# Patient Record
Sex: FEMALE | Race: WHITE | Employment: FULL TIME | URBAN - METROPOLITAN AREA
[De-identification: names, ages, dates, MRNs, and addresses within clinical notes are randomized per-mention and may not be internally consistent; named-entity substitution may affect disease eponyms.]

---

## 2019-01-18 ENCOUNTER — OFFICE VISIT (OUTPATIENT)
Dept: URGENT CARE | Age: 39
End: 2019-01-18

## 2019-01-18 VITALS
HEART RATE: 101 BPM | RESPIRATION RATE: 18 BRPM | TEMPERATURE: 98 F | WEIGHT: 140 LBS | SYSTOLIC BLOOD PRESSURE: 138 MMHG | HEIGHT: 62 IN | DIASTOLIC BLOOD PRESSURE: 86 MMHG | OXYGEN SATURATION: 98 % | BODY MASS INDEX: 25.76 KG/M2

## 2019-01-18 DIAGNOSIS — J11.1 INFLUENZA: Primary | ICD-10-CM

## 2019-01-18 DIAGNOSIS — J45.41 MODERATE PERSISTENT ASTHMA WITH ACUTE EXACERBATION: ICD-10-CM

## 2019-01-18 DIAGNOSIS — Z20.828 EXPOSURE TO THE FLU: ICD-10-CM

## 2019-01-18 DIAGNOSIS — R68.89 FLU-LIKE SYMPTOMS: ICD-10-CM

## 2019-01-18 LAB
FLUAV+FLUBV AG NOSE QL IA.RAPID: NEGATIVE POS/NEG
FLUAV+FLUBV AG NOSE QL IA.RAPID: NEGATIVE POS/NEG
VALID INTERNAL CONTROL?: YES

## 2019-01-18 RX ORDER — ALBUTEROL SULFATE 90 UG/1
2 AEROSOL, METERED RESPIRATORY (INHALATION)
Qty: 1 INHALER | Refills: 0 | Status: SHIPPED | OUTPATIENT
Start: 2019-01-18 | End: 2019-06-13 | Stop reason: ALTCHOICE

## 2019-01-18 RX ORDER — PREDNISONE 20 MG/1
TABLET ORAL
Qty: 8 TAB | Refills: 0 | Status: SHIPPED | OUTPATIENT
Start: 2019-01-18 | End: 2020-08-13

## 2019-01-18 RX ORDER — IPRATROPIUM BROMIDE AND ALBUTEROL SULFATE 2.5; .5 MG/3ML; MG/3ML
3 SOLUTION RESPIRATORY (INHALATION)
Status: COMPLETED | OUTPATIENT
Start: 2019-01-18 | End: 2019-01-18

## 2019-01-18 RX ORDER — OSELTAMIVIR PHOSPHATE 75 MG/1
75 CAPSULE ORAL 2 TIMES DAILY
Qty: 10 CAP | Refills: 0 | Status: SHIPPED | OUTPATIENT
Start: 2019-01-18 | End: 2019-01-23

## 2019-01-18 RX ADMIN — IPRATROPIUM BROMIDE AND ALBUTEROL SULFATE 3 ML: 2.5; .5 SOLUTION RESPIRATORY (INHALATION) at 16:06

## 2019-01-18 NOTE — PATIENT INSTRUCTIONS
Follow up with PCP without improvement over next 5-7 days sooner/immediately for new or worsening  Go to ED immediately for any worsening trouble breathing, new worsening or changes. Influenza (Flu): Care Instructions  Your Care Instructions    Influenza (flu) is an infection in the lungs and breathing passages. It is caused by the influenza virus. There are different strains, or types, of the flu virus from year to year. Unlike the common cold, the flu comes on suddenly and the symptoms, such as a cough, congestion, fever, chills, fatigue, aches, and pains, are more severe. These symptoms may last up to 10 days. Although the flu can make you feel very sick, it usually doesn't cause serious health problems. Home treatment is usually all you need for flu symptoms. But your doctor may prescribe antiviral medicine to prevent other health problems, such as pneumonia, from developing. Older people and those who have a long-term health condition, such as lung disease, are most at risk for having pneumonia or other health problems. Follow-up care is a key part of your treatment and safety. Be sure to make and go to all appointments, and call your doctor if you are having problems. It's also a good idea to know your test results and keep a list of the medicines you take. How can you care for yourself at home? · Get plenty of rest.  · Drink plenty of fluids, enough so that your urine is light yellow or clear like water. If you have kidney, heart, or liver disease and have to limit fluids, talk with your doctor before you increase the amount of fluids you drink. · Take an over-the-counter pain medicine if needed, such as acetaminophen (Tylenol), ibuprofen (Advil, Motrin), or naproxen (Aleve), to relieve fever, headache, and muscle aches. Read and follow all instructions on the label. No one younger than 20 should take aspirin. It has been linked to Reye syndrome, a serious illness. · Do not smoke.  Smoking can make the flu worse. If you need help quitting, talk to your doctor about stop-smoking programs and medicines. These can increase your chances of quitting for good. · Breathe moist air from a hot shower or from a sink filled with hot water to help clear a stuffy nose. · Before you use cough and cold medicines, check the label. These medicines may not be safe for young children or for people with certain health problems. · If the skin around your nose and lips becomes sore, put some petroleum jelly on the area. · To ease coughing:  ? Drink fluids to soothe a scratchy throat. ? Suck on cough drops or plain hard candy. ? Take an over-the-counter cough medicine that contains dextromethorphan to help you get some sleep. Read and follow all instructions on the label. ? Raise your head at night with an extra pillow. This may help you rest if coughing keeps you awake. · Take any prescribed medicine exactly as directed. Call your doctor if you think you are having a problem with your medicine. To avoid spreading the flu  · Wash your hands regularly, and keep your hands away from your face. · Stay home from school, work, and other public places until you are feeling better and your fever has been gone for at least 24 hours. The fever needs to have gone away on its own without the help of medicine. · Ask people living with you to talk to their doctors about preventing the flu. They may get antiviral medicine to keep from getting the flu from you. · To prevent the flu in the future, get a flu vaccine every fall. Encourage people living with you to get the vaccine. · Cover your mouth when you cough or sneeze. When should you call for help? Call 911 anytime you think you may need emergency care.  For example, call if:    · You have severe trouble breathing.    Call your doctor now or seek immediate medical care if:    · You have new or worse trouble breathing.     · You seem to be getting much sicker.     · You feel very sleepy or confused.     · You have a new or higher fever.     · You get a new rash.    Watch closely for changes in your health, and be sure to contact your doctor if:    · You begin to get better and then get worse.     · You are not getting better after 1 week. Where can you learn more? Go to http://tushar-richie.info/. Enter O804 in the search box to learn more about \"Influenza (Flu): Care Instructions. \"  Current as of: September 5, 2018  Content Version: 11.9  © 9019-2910 [employer]. Care instructions adapted under license by [employer] (which disclaims liability or warranty for this information). If you have questions about a medical condition or this instruction, always ask your healthcare professional. [address] any warranty or liability for your use of this information. Wheezing or Bronchoconstriction: Care Instructions  Your Care Instructions  Wheezing is a whistling noise made during breathing. It occurs when the small airways, or bronchial tubes, that lead to your lungs swell or contract (spasm) and become narrow. This narrowing is called bronchoconstriction. When your airways constrict, it is hard for air to pass through and this makes it hard for you to breathe. Wheezing and bronchoconstriction can be caused by many problems, including:  · An infection such as the flu or a cold. · Allergies such as hay fever. · Diseases such as asthma or chronic obstructive pulmonary disease. · Smoking. Treatment for your wheezing depends on what is causing the problem. Your wheezing may get better without treatment. But you may need to pay attention to things that cause your wheezing and avoid them. Or you may need medicine to help treat the wheezing and to reduce the swelling or to relieve spasms in your lungs. Follow-up care is a key part of your treatment and safety.  Be sure to make and go to all appointments, and call your doctor if you are having problems. It is also a good idea to know your test results and keep a list of the medicines you take. How can you care for yourself at home? · Take your medicine exactly as prescribed. Call your doctor if you think you are having a problem with your medicine. You will get more details on the specific medicine your doctor prescribes. · If your doctor prescribed antibiotics, take them as directed. Do not stop taking them just because you feel better. You need to take the full course of antibiotics. · Breathe moist air from a humidifier, hot shower, or sink filled with hot water. This may help ease your symptoms and make it easier for you to breathe. · If you have congestion in your nose and throat, drinking plenty of fluids, especially hot fluids, may help relieve your symptoms. If you have kidney, heart, or liver disease and have to limit fluids, talk with your doctor before you increase the amount of fluids you drink. · If you have mucus in your airways, it may help to breathe deeply and cough. · Do not smoke or allow others to smoke around you. Smoking can make your wheezing worse. If you need help quitting, talk to your doctor about stop-smoking programs and medicines. These can increase your chances of quitting for good. · Avoid things that may cause your wheezing. These may include colds, smoke, air pollution, dust, pollen, pets, cockroaches, stress, and cold air. When should you call for help? Call 911 anytime you think you may need emergency care.  For example, call if:    · You have severe trouble breathing.     · You passed out (lost consciousness).    Call your doctor now or seek immediate medical care if:    · You cough up yellow, dark brown, or bloody mucus (sputum).     · You have new or worse shortness of breath.     · Your wheezing is not getting better or it gets worse after you start taking your medicine.    Watch closely for changes in your health, and be sure to contact your doctor if:    · You do not get better as expected. Where can you learn more? Go to http://tushar-richie.info/. Enter 999 1982 in the search box to learn more about \"Wheezing or Bronchoconstriction: Care Instructions. \"  Current as of: September 5, 2018  Content Version: 11.9  © 2234-7752 [employer]. Care instructions adapted under license by [employer] (which disclaims liability or warranty for this information). If you have questions about a medical condition or this instruction, always ask your healthcare professional. [place] [address] any warranty or liability for your use of this information.

## 2019-01-18 NOTE — PROGRESS NOTES
Here for cough  Has had worsening cough over past week  Dry tight with wheezing and night awakenings hx of ashtma but doesn't currently have a working albuterol inhaler  Son just got diagnosed as positive flu. She also today has new onset body aches and tiredness. No measured fever or chills. Denies chest pain, near syncope, nausea or vomiting  Overall worsening. Requesting albuterol refill today. She is a daily smoker. Past Medical History:   Diagnosis Date    Abnormal Pap smear     Asthma         Past Surgical History:   Procedure Laterality Date    HX OTHER SURGICAL  leep 1999         Family History   Problem Relation Age of Onset    Hypertension Mother         Social History     Socioeconomic History    Marital status: UNKNOWN     Spouse name: Not on file    Number of children: Not on file    Years of education: Not on file    Highest education level: Not on file   Social Needs    Financial resource strain: Not on file    Food insecurity - worry: Not on file    Food insecurity - inability: Not on file   Wallstr needs - medical: Not on file   Wallstr needs - non-medical: Not on file   Occupational History    Not on file   Tobacco Use    Smoking status: Current Every Day Smoker     Packs/day: 1.00    Smokeless tobacco: Never Used   Substance and Sexual Activity    Alcohol use: No    Drug use: Not on file    Sexual activity: Not Currently     Partners: Male     Birth control/protection: None   Other Topics Concern    Not on file   Social History Narrative    Not on file                ALLERGIES: Patient has no known allergies. Review of Systems   Constitutional: Positive for fatigue. Respiratory: Positive for wheezing. Cardiovascular: Negative for chest pain and palpitations. Gastrointestinal: Negative for nausea and vomiting. Neurological: Negative for light-headedness. All other systems reviewed and are negative.       Vitals:    01/18/19 1512 01/18/19 1622   BP: 138/86    Pulse: (!) 112 (!) 101   Resp: 18    Temp: 98 °F (36.7 °C)    SpO2: 97% 98%   Weight: 140 lb (63.5 kg)    Height: 5' 2\" (1.575 m)        Physical Exam   Constitutional: She is oriented to person, place, and time. No distress. Non-toxic appearing, well hydrated   HENT:   OP clear and moist  TMs normal  Nasal turbinates erythematous with nasal sniffling. Eyes: Conjunctivae and EOM are normal. Pupils are equal, round, and reactive to light. No scleral icterus. Neck: Normal range of motion. Neck supple. Cardiovascular: Regular rhythm and normal heart sounds. Exam reveals no gallop and no friction rub. No murmur heard. Apical pulse 101bpm   Pulmonary/Chest: Effort normal. No stridor. No respiratory distress. She has no rales. Wheezing present throughout all lung fields: post neb: wheezing still present but moderate improvement. Patient promotes that she \"feels so much better\" is breathing better. No diminished breath sounds. Lymphadenopathy:     She has no cervical adenopathy. Neurological: She is alert and oriented to person, place, and time. Skin: Skin is warm and dry. No rash noted. She is not diaphoretic. No erythema. No pallor. Psychiatric: She has a normal mood and affect. Her behavior is normal. Thought content normal.   Nursing note and vitals reviewed. MDM     Differential Diagnosis; Clinical Impression; Plan:       CLINICAL IMPRESSION:  (N45.507) Exposure to the flu  (primary encounter diagnosis)  (R68.89) Flu-like symptoms  (J45.41) Moderate persistent asthma with acute exacerbation    Orders Placed This Encounter      oseltamivir (TAMIFLU) 75 mg capsule          Sig: Take 1 Cap by mouth two (2) times a day for 5 days. Dispense:  10 Cap          Refill:  0      albuterol (PROVENTIL HFA, VENTOLIN HFA, PROAIR HFA) 90 mcg/actuation inhaler           Sig: Take 2 Puffs by inhalation every six (6) hours as needed for Wheezing.           Dispense:  1 Inhaler          Refill:  0      predniSONE (DELTASONE) 20 mg tablet          Sig: Take 2 tabs by mouth one time daily with food for 4 days. Dispense:  8 Tab          Refill:  0      albuterol-ipratropium (DUO-NEB) 2.5 MG-0.5 MG/3 ML          Order Specific Question: MODE OF DELIVERY          Answer: Nebulizer      Plan:  1. Negative flu test. This appears to be astham exacerbation with new possible flu (son tested positive) will rx albuterol and short burst of prednisone and have start tamiflu  2. Maintain adequate fluid intake  3. Review handouts  4. Advised immediate ED eval for new, worsening or changes. We have reviewed concerning signs/symptoms, normal vs abnormal progression of medical condition and when to seek immediate medical attention. Schedule with PCP or Urgent Care immediately for worsening or new symptoms. See your PCP if there is not at least some improvement in symptoms within the next 4-5 days. You should see your PCP for updates on your routine health maintenance. XR Results (most recent):  Results from Appointment encounter on 01/18/19   XR CHEST PA LAT    Narrative EXAM: XR CHEST PA LAT    INDICATION: SOB, cough x 2 weeks, body aches    COMPARISON: None. FINDINGS: PA and lateral radiographs of the chest demonstrate clear lungs. The  cardiac and mediastinal contours and pulmonary vascularity are normal. The bones  and soft tissues are within normal limits.        Impression IMPRESSION: Normal chest.         Procedures

## 2019-01-19 RX ORDER — OSELTAMIVIR PHOSPHATE 75 MG/1
75 CAPSULE ORAL 2 TIMES DAILY
Qty: 10 CAP | Refills: 0 | Status: SHIPPED | OUTPATIENT
Start: 2019-01-19 | End: 2019-01-24

## 2019-06-13 ENCOUNTER — OFFICE VISIT (OUTPATIENT)
Dept: INTERNAL MEDICINE CLINIC | Age: 39
End: 2019-06-13

## 2019-06-13 VITALS
TEMPERATURE: 98.3 F | HEART RATE: 91 BPM | RESPIRATION RATE: 16 BRPM | BODY MASS INDEX: 28.89 KG/M2 | WEIGHT: 157 LBS | HEIGHT: 62 IN | SYSTOLIC BLOOD PRESSURE: 100 MMHG | DIASTOLIC BLOOD PRESSURE: 74 MMHG | OXYGEN SATURATION: 96 %

## 2019-06-13 DIAGNOSIS — J31.0 RHINITIS, UNSPECIFIED TYPE: ICD-10-CM

## 2019-06-13 DIAGNOSIS — J45.41 MODERATE PERSISTENT ASTHMA WITH ACUTE EXACERBATION: ICD-10-CM

## 2019-06-13 DIAGNOSIS — J02.9 PHARYNGITIS, UNSPECIFIED ETIOLOGY: ICD-10-CM

## 2019-06-13 DIAGNOSIS — F19.11 HISTORY OF SUBSTANCE ABUSE (HCC): ICD-10-CM

## 2019-06-13 DIAGNOSIS — F17.200 CURRENT EVERY DAY SMOKER: ICD-10-CM

## 2019-06-13 DIAGNOSIS — Z76.89 ESTABLISHING CARE WITH NEW DOCTOR, ENCOUNTER FOR: Primary | ICD-10-CM

## 2019-06-13 DIAGNOSIS — R91.8 LUNG FIELD ABNORMAL FINDING ON EXAMINATION: ICD-10-CM

## 2019-06-13 LAB
CHOLEST SERPL-MCNC: 171 MG/DL
GLUCOSE POC: 112 MG/DL
HBA1C MFR BLD HPLC: 5.3 %
HDLC SERPL-MCNC: 52 MG/DL
LDL CHOLESTEROL POC: 99 MG/DL
NON-HDL CHOLESTEROL, 011976: 120
TCHOL/HDL RATIO (POC): 99
TRIGL SERPL-MCNC: 106 MG/DL

## 2019-06-13 RX ORDER — FLUTICASONE PROPIONATE 50 MCG
2 SPRAY, SUSPENSION (ML) NASAL DAILY
Qty: 1 BOTTLE | Refills: 1 | Status: SHIPPED | OUTPATIENT
Start: 2019-06-13 | End: 2022-09-27

## 2019-06-13 RX ORDER — ALBUTEROL SULFATE 90 UG/1
1 AEROSOL, METERED RESPIRATORY (INHALATION)
Qty: 3 INHALER | Refills: 3 | Status: SHIPPED | OUTPATIENT
Start: 2019-06-13 | End: 2020-03-19 | Stop reason: SDUPTHER

## 2019-06-13 NOTE — PATIENT INSTRUCTIONS
Call if not gettting better. Discussed before pt left but couldn't wait for AVS reprint. · Smoking cessation need, pt desires reviewed briefly today, more detail on RV. Interested in [facility]. · Can use OTC   · Afrin type nasal spray first day or two for faster relief  · Mucinex Max declined, has OTC NSAID for cold.

## 2019-06-13 NOTE — PROGRESS NOTES
1. Have you been to the ER, urgent care clinic since your last visit? Hospitalized since your last visit?no    2. Have you seen or consulted any other health care providers outside of the [address] [place] [address] since your last visit? Include any pap smears or colon screening. No    3 most recent PHQ Screens 6/13/2019   Little interest or pleasure in doing things Several days   Feeling down, depressed, irritable, or hopeless Not at all   Total Score PHQ 2 1     Chief Complaint   Patient presents with    New Patient    Ear Pain    URI    Sore Throat     Per Dr. Rios Fears.,  verbal order given for needed amb poc labs.

## 2019-06-18 LAB
ALBUMIN SERPL-MCNC: 4.1 G/DL (ref 3.5–5.5)
ALBUMIN/GLOB SERPL: 1.5 {RATIO} (ref 1.2–2.2)
ALP SERPL-CCNC: 62 IU/L (ref 39–117)
ALT SERPL-CCNC: 8 IU/L (ref 0–32)
AST SERPL-CCNC: 19 IU/L (ref 0–40)
BASOPHILS # BLD AUTO: 0.1 X10E3/UL (ref 0–0.2)
BASOPHILS NFR BLD AUTO: 1 %
BILIRUB SERPL-MCNC: 0.5 MG/DL (ref 0–1.2)
BUN SERPL-MCNC: 9 MG/DL (ref 6–24)
BUN/CREAT SERPL: 14 (ref 9–23)
CALCIUM SERPL-MCNC: 9.5 MG/DL (ref 8.7–10.2)
CHLORIDE SERPL-SCNC: 101 MMOL/L (ref 96–106)
CO2 SERPL-SCNC: 25 MMOL/L (ref 20–29)
CREAT SERPL-MCNC: 0.66 MG/DL (ref 0.57–1)
EOSINOPHIL # BLD AUTO: 0.3 X10E3/UL (ref 0–0.4)
EOSINOPHIL NFR BLD AUTO: 3 %
ERYTHROCYTE [DISTWIDTH] IN BLOOD BY AUTOMATED COUNT: 13.4 % (ref 12.3–15.4)
GLOBULIN SER CALC-MCNC: 2.7 G/DL (ref 1.5–4.5)
GLUCOSE SERPL-MCNC: 118 MG/DL (ref 65–99)
HCT VFR BLD AUTO: 41.7 % (ref 34–46.6)
HGB BLD-MCNC: 13.6 G/DL (ref 11.1–15.9)
IMM GRANULOCYTES # BLD AUTO: 0 X10E3/UL (ref 0–0.1)
IMM GRANULOCYTES NFR BLD AUTO: 0 %
LYMPHOCYTES # BLD AUTO: 1.4 X10E3/UL (ref 0.7–3.1)
LYMPHOCYTES NFR BLD AUTO: 17 %
MCH RBC QN AUTO: 29.1 PG (ref 26.6–33)
MCHC RBC AUTO-ENTMCNC: 32.6 G/DL (ref 31.5–35.7)
MCV RBC AUTO: 89 FL (ref 79–97)
MONOCYTES # BLD AUTO: 0.9 X10E3/UL (ref 0.1–0.9)
MONOCYTES NFR BLD AUTO: 11 %
NEUTROPHILS # BLD AUTO: 5.9 X10E3/UL (ref 1.4–7)
NEUTROPHILS NFR BLD AUTO: 68 %
PLATELET # BLD AUTO: 268 X10E3/UL (ref 150–450)
POTASSIUM SERPL-SCNC: 5.5 MMOL/L (ref 3.5–5.2)
PROT SERPL-MCNC: 6.8 G/DL (ref 6–8.5)
RBC # BLD AUTO: 4.68 X10E6/UL (ref 3.77–5.28)
SODIUM SERPL-SCNC: 143 MMOL/L (ref 134–144)
WBC # BLD AUTO: 8.6 X10E3/UL (ref 3.4–10.8)

## 2020-03-04 PROBLEM — J45.909 ASTHMA: Status: ACTIVE | Noted: 2020-03-04

## 2020-03-04 PROBLEM — F19.11 HISTORY OF SUBSTANCE ABUSE (HCC): Status: ACTIVE | Noted: 2020-03-04
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
## 2020-03-19 DIAGNOSIS — J45.41 MODERATE PERSISTENT ASTHMA WITH ACUTE EXACERBATION: ICD-10-CM

## 2020-03-19 RX ORDER — ALBUTEROL SULFATE 90 UG/1
1 AEROSOL, METERED RESPIRATORY (INHALATION)
Qty: 2 INHALER | Refills: 0 | Status: SHIPPED | OUTPATIENT
Start: 2020-03-19 | End: 2020-04-03

## 2020-04-03 DIAGNOSIS — J45.41 MODERATE PERSISTENT ASTHMA WITH ACUTE EXACERBATION: ICD-10-CM

## 2020-04-03 RX ORDER — ALBUTEROL SULFATE 90 UG/1
1 AEROSOL, METERED RESPIRATORY (INHALATION)
Qty: 3 INHALER | Refills: 1 | Status: SHIPPED | OUTPATIENT
Start: 2020-04-03

## 2020-08-13 ENCOUNTER — OFFICE VISIT (OUTPATIENT)
Dept: URGENT CARE | Age: 40
End: 2020-08-13
Payer: COMMERCIAL

## 2020-08-13 VITALS — HEART RATE: 76 BPM | TEMPERATURE: 97.7 F | RESPIRATION RATE: 16 BRPM | OXYGEN SATURATION: 98 %

## 2020-08-13 DIAGNOSIS — Z11.59 SCREENING FOR VIRAL DISEASE: Primary | ICD-10-CM

## 2020-08-13 PROCEDURE — 99212 OFFICE O/P EST SF 10 MIN: CPT | Performed by: FAMILY MEDICINE

## 2020-08-13 NOTE — PROGRESS NOTES
2020    Peyton Callow (:  1980) is a 38 y.o.  female, here requesting COVID-19 testing    History of Present Illness  Close contact with a lab confirmed COVID-19 patient within 14 days of symptom onset     Visit Vitals  Pulse 76   Temp 97.7 °F (36.5 °C)   Resp 16   SpO2 98%       ASSESSMENT  Screening for COVID-19/ Viral disease    PLAN  POCT influenza testing - Not Tested  COVID-19 sample collected and submitted. Patient given detailed CDC instructions contained within After Visit Summary. An  electronic signature was used to authenticate this note.     --Graeme Mcintosh MD

## 2020-08-15 LAB — SARS-COV-2, NAA: NOT DETECTED

## 2020-08-24 ENCOUNTER — TELEPHONE (OUTPATIENT)
Dept: INTERNAL MEDICINE CLINIC | Age: 40
End: 2020-08-24

## 2020-08-24 NOTE — TELEPHONE ENCOUNTER
Called pt back to schedule 1st available NP appt, unable to LVM, box full.       ----- Message from [employer] sent at 8/22/2020 10:55 AM EDT -----  Regarding: Dr. Ras Nolasco  Appointment not available    Caller's first and last name and relationship to patient (if not the patient): Pt      Best contact number: [phone]      Preferred date and time: Any      Scheduled appointment date and time: N/A      Reason for appointment: Would like to be considered for a NP       Details to clarify the request:      [employer]

## 2020-08-26 ENCOUNTER — TELEPHONE (OUTPATIENT)
Dept: INTERNAL MEDICINE CLINIC | Age: 40
End: 2020-08-26

## 2020-08-26 NOTE — TELEPHONE ENCOUNTER
Called back to schedule NP appt, vm box full.    ----- Message from Markel Nageotte sent at 8/25/2020  3:47 PM EDT -----  Regarding: Dr. Julianne Benavides  Appointment not available    Caller's first and last name and relationship to patient (if not the patient):      Best contact number: [phone]      Preferred date and time: sometime within the week of 9/7-9/11      Scheduled appointment date and time: N/A      Reason for appointment: Pt claimed she was schedule for a NP appointment with the [facility] but nothing was on file and she wanted to R/S for this week enclosed.       Details to clarify the request:      Markel Nageotte

## 2022-03-19 PROBLEM — J45.909 ASTHMA: Status: ACTIVE | Noted: 2020-03-04

## 2022-03-20 PROBLEM — F19.11 HISTORY OF SUBSTANCE ABUSE (HCC): Status: ACTIVE | Noted: 2020-03-04

## 2022-09-27 ENCOUNTER — OFFICE VISIT (OUTPATIENT)
Dept: URGENT CARE | Age: 42
End: 2022-09-27
Payer: COMMERCIAL

## 2022-09-27 VITALS
OXYGEN SATURATION: 94 % | TEMPERATURE: 98.6 F | WEIGHT: 166.9 LBS | BODY MASS INDEX: 30.53 KG/M2 | RESPIRATION RATE: 18 BRPM | DIASTOLIC BLOOD PRESSURE: 76 MMHG | SYSTOLIC BLOOD PRESSURE: 133 MMHG | HEART RATE: 91 BPM

## 2022-09-27 DIAGNOSIS — R05.9 COUGH: ICD-10-CM

## 2022-09-27 DIAGNOSIS — J40 BRONCHITIS: Primary | ICD-10-CM

## 2022-09-27 PROCEDURE — S9083 URGENT CARE CENTER GLOBAL: HCPCS | Performed by: FAMILY MEDICINE

## 2022-09-27 RX ORDER — PREDNISONE 5 MG/1
TABLET ORAL
Qty: 21 TABLET | Refills: 0 | Status: SHIPPED | OUTPATIENT
Start: 2022-09-27

## 2022-09-27 RX ORDER — ALBUTEROL SULFATE 90 UG/1
2 AEROSOL, METERED RESPIRATORY (INHALATION)
Qty: 1 EACH | Refills: 0 | Status: SHIPPED | OUTPATIENT
Start: 2022-09-27

## 2022-09-27 RX ORDER — ALBUTEROL SULFATE 0.83 MG/ML
2.5 SOLUTION RESPIRATORY (INHALATION)
Qty: 45 EACH | Refills: 0 | Status: SHIPPED | OUTPATIENT
Start: 2022-09-27

## 2022-09-27 RX ORDER — DOXYCYCLINE 100 MG/1
100 TABLET ORAL 2 TIMES DAILY
Qty: 20 TABLET | Refills: 0 | Status: SHIPPED | OUTPATIENT
Start: 2022-09-27 | End: 2022-10-07

## 2022-09-27 NOTE — PROGRESS NOTES
Faustino Velazquez is a 36 y.o. female who presents with worsening cough x 1 month; now with SOB for the past week. Also reports fever a few days ago. Has hx of Asthma. The history is provided by the patient. Past Medical History:   Diagnosis Date    Abnormal Pap smear     Asthma         Past Surgical History:   Procedure Laterality Date    HX OTHER SURGICAL  leep 1999         Family History   Problem Relation Age of Onset    Hypertension Mother         Social History     Socioeconomic History    Marital status: SINGLE     Spouse name: Not on file    Number of children: Not on file    Years of education: Not on file    Highest education level: Not on file   Occupational History    Not on file   Tobacco Use    Smoking status: Every Day     Packs/day: 1.00     Years: 24.00     Pack years: 24.00     Types: Cigarettes    Smokeless tobacco: Never   Substance and Sexual Activity    Alcohol use: No    Drug use: Not Currently    Sexual activity: Not Currently     Partners: Male     Birth control/protection: None   Other Topics Concern    Not on file   Social History Narrative    Not on file     Social Determinants of Health     Financial Resource Strain: Not on file   Food Insecurity: Not on file   Transportation Needs: Not on file   Physical Activity: Not on file   Stress: Not on file   Social Connections: Not on file   Intimate Partner Violence: Not on file   Housing Stability: Not on file                ALLERGIES: Patient has no known allergies. Review of Systems   Constitutional:  Negative for activity change, appetite change and fever. Respiratory:  Positive for cough, chest tightness and shortness of breath. Vitals:    09/27/22 1308 09/27/22 1310   BP:  133/76   Pulse:  91   Resp:  18   Temp:  98.6 °F (37 °C)   SpO2:  94%   Weight: 166 lb 14.4 oz (75.7 kg)        Physical Exam  Vitals and nursing note reviewed. Constitutional:       General: She is not in acute distress.      Appearance: She is well-developed. She is not diaphoretic. HENT:      Right Ear: Tympanic membrane, ear canal and external ear normal.      Left Ear: Tympanic membrane, ear canal and external ear normal.      Nose: Congestion present. Right Sinus: No maxillary sinus tenderness or frontal sinus tenderness. Left Sinus: No maxillary sinus tenderness or frontal sinus tenderness. Mouth/Throat:      Pharynx: No oropharyngeal exudate or posterior oropharyngeal erythema. Tonsils: No tonsillar abscesses. Cardiovascular:      Rate and Rhythm: Normal rate and regular rhythm. Heart sounds: Normal heart sounds. Pulmonary:      Effort: Pulmonary effort is normal. No respiratory distress. Breath sounds: No stridor. Wheezing present. No rhonchi or rales. Lymphadenopathy:      Cervical: No cervical adenopathy. Neurological:      Mental Status: She is alert. Psychiatric:         Behavior: Behavior normal.         Thought Content: Thought content normal.         Judgment: Judgment normal.       MDM    ICD-10-CM ICD-9-CM   1. Bronchitis  J40 490   2. Cough  R05.9 786.2       Orders Placed This Encounter    XR CHEST PA LAT     Standing Status:   Future     Number of Occurrences:   1     Standing Expiration Date:   10/28/2023     Order Specific Question:   Is Patient Pregnant? Answer:   No     Order Specific Question:   Reason for Exam     Answer:   worsening cough x 1 month    predniSONE (STERAPRED) 5 mg dose pack     Sig: See administration instruction per 5mg dose pack     Dispense:  21 Tablet     Refill:  0    albuterol (PROVENTIL HFA, VENTOLIN HFA, PROAIR HFA) 90 mcg/actuation inhaler     Sig: Take 2 Puffs by inhalation every six (6) hours as needed for Wheezing. Dispense:  1 Each     Refill:  0    albuterol (PROVENTIL VENTOLIN) 2.5 mg /3 mL (0.083 %) nebu     Sig: 3 mL by Nebulization route every six (6) hours as needed for Wheezing.      Dispense:  45 Each     Refill:  0    doxycycline (ADOXA) 100 mg tablet     Sig: Take 1 Tablet by mouth two (2) times a day for 10 days. Dispense:  20 Tablet     Refill:  0      Start Doxycycline and Prednisone  Albuterol prn  The patient is to follow up with PCP. If signs and symptoms become worse the pt is to go to the ER. XR Results (most recent):  Results from Appointment encounter on 09/27/22    XR CHEST PA LAT    Narrative  Clinical indication: Cough. Frontal and lateral views of the chest obtained, comparison January 18, 2019. The a heart size is normal. There is no acute infiltrate. Mild hyperinflation. Impression  No acute infiltrate.         Procedures

## 2023-05-02 RX ORDER — ALBUTEROL SULFATE 90 UG/1
2 AEROSOL, METERED RESPIRATORY (INHALATION) EVERY 6 HOURS PRN
COMMUNITY
Start: 2022-09-27

## 2023-08-13 ENCOUNTER — HOSPITAL ENCOUNTER (OUTPATIENT)
Facility: HOSPITAL | Age: 43
Setting detail: SPECIMEN
Discharge: HOME | End: 2023-08-16

## 2023-08-13 ENCOUNTER — OFFICE VISIT (OUTPATIENT)
Age: 43
End: 2023-08-13

## 2023-08-13 VITALS
TEMPERATURE: 99.1 F | DIASTOLIC BLOOD PRESSURE: 76 MMHG | OXYGEN SATURATION: 93 % | HEART RATE: 105 BPM | WEIGHT: 170 LBS | SYSTOLIC BLOOD PRESSURE: 127 MMHG

## 2023-08-13 DIAGNOSIS — Z20.822 SUSPECTED COVID-19 VIRUS INFECTION: ICD-10-CM

## 2023-08-13 DIAGNOSIS — B34.9 VIRAL ILLNESS: Primary | ICD-10-CM

## 2023-08-13 LAB
Lab: NORMAL
QC PASS/FAIL: NORMAL
SARS-COV-2, POC: NORMAL
STREP PYOGENES DNA, POC: NEGATIVE
VALID INTERNAL CONTROL, POC: YES

## 2023-08-13 PROCEDURE — 87070 CULTURE OTHR SPECIMN AEROBIC: CPT

## 2023-08-13 RX ORDER — ALBUTEROL SULFATE 90 UG/1
1 AEROSOL, METERED RESPIRATORY (INHALATION) EVERY 4 HOURS PRN
Qty: 18 G | Refills: 0 | Status: SHIPPED | OUTPATIENT
Start: 2023-08-13

## 2023-08-13 ASSESSMENT — ENCOUNTER SYMPTOMS
TROUBLE SWALLOWING: 0
CHEST TIGHTNESS: 0
SHORTNESS OF BREATH: 0
VOMITING: 0
COUGH: 0
RHINORRHEA: 1
NAUSEA: 0
SORE THROAT: 1
SINUS PRESSURE: 1

## 2023-08-15 DIAGNOSIS — J02.0 STREP PHARYNGITIS: Primary | ICD-10-CM

## 2023-08-15 LAB
BACTERIA SPEC CULT: ABNORMAL
BACTERIA SPEC CULT: ABNORMAL
SERVICE CMNT-IMP: ABNORMAL

## 2023-08-15 RX ORDER — AMOXICILLIN 500 MG/1
500 CAPSULE ORAL 2 TIMES DAILY
Qty: 20 CAPSULE | Refills: 0 | Status: SHIPPED | OUTPATIENT
Start: 2023-08-15 | End: 2023-08-25

## 2024-02-02 ENCOUNTER — OFFICE VISIT (OUTPATIENT)
Age: 44
End: 2024-02-02

## 2024-02-02 VITALS
WEIGHT: 175 LBS | TEMPERATURE: 98.1 F | HEART RATE: 94 BPM | SYSTOLIC BLOOD PRESSURE: 127 MMHG | RESPIRATION RATE: 18 BRPM | OXYGEN SATURATION: 97 % | DIASTOLIC BLOOD PRESSURE: 77 MMHG

## 2024-02-02 DIAGNOSIS — J45.41 MODERATE PERSISTENT INTRINSIC ASTHMA WITH ACUTE EXACERBATION: ICD-10-CM

## 2024-02-02 DIAGNOSIS — J20.9 BRONCHITIS WITH BRONCHOSPASM: Primary | ICD-10-CM

## 2024-02-02 RX ORDER — ALBUTEROL SULFATE 90 UG/1
1 AEROSOL, METERED RESPIRATORY (INHALATION) EVERY 4 HOURS PRN
Qty: 18 G | Refills: 0 | Status: SHIPPED | OUTPATIENT
Start: 2024-02-02

## 2024-02-02 RX ORDER — PREDNISONE 10 MG/1
TABLET ORAL
Qty: 21 EACH | Refills: 0 | Status: SHIPPED | OUTPATIENT
Start: 2024-02-02

## 2024-02-02 RX ORDER — DOXYCYCLINE 100 MG/1
100 CAPSULE ORAL 2 TIMES DAILY
Qty: 14 CAPSULE | Refills: 0 | Status: SHIPPED | OUTPATIENT
Start: 2024-02-02 | End: 2024-02-09

## 2024-02-02 RX ORDER — ALBUTEROL SULFATE 2.5 MG/3ML
2.5 SOLUTION RESPIRATORY (INHALATION) EVERY 6 HOURS PRN
Qty: 30 EACH | Refills: 0 | Status: SHIPPED | OUTPATIENT
Start: 2024-02-02

## 2024-02-02 NOTE — PROGRESS NOTES
Ju Spicer (:  1980) is a 43 y.o. female,Established patient, here for evaluation of the following chief complaint(s):  Congestion (Cough, congestion. Light headed, fatigue  x 6 weeks with no relief. This morning was the worst of the symptoms )      ASSESSMENT/PLAN:  Visit Diagnoses and Associated Orders       Bronchitis with bronchospasm    -  Primary    XR CHEST (2 VW) [05961 CPT(R)]   - Future Order    doxycycline monohydrate (MONODOX) 100 MG capsule [9900]      predniSONE 10 MG (21) TBPK [083770]      albuterol sulfate HFA (PROVENTIL;VENTOLIN;PROAIR) 108 (90 Base) MCG/ACT inhaler [10109]      albuterol (PROVENTIL) (2.5 MG/3ML) 0.083% nebulizer solution [250]      Nebulizers (COMPRESSOR/NEBULIZER) MISC [26680]                      Per orders.  Pt requests albuterol nebs, new machine (hers broke), and new inh.        Follow up with PCP in PRN days if symptoms persist or ED if symptoms worsen.    SUBJECTIVE/OBJECTIVE:  HPI  HPI:   43 y.o. female presents with symptoms of sinus congestion and cough for 6 weeks, rapidly worsening in last 24 hours.  Coughing today and suddenly felt like couldn't catch her breath.  Some wheezing.  Found albuterol inh and took with moderate improvement.  Denies fevers, CP.  Smokes cigarettes.  Hx asthma and bronchitis.         Vitals:    24 0948   BP: 127/77   Site: Right Upper Arm   Position: Sitting   Cuff Size: Medium Adult   Pulse: 94   Resp: 18   Temp: 98.1 °F (36.7 °C)   TempSrc: Oral   SpO2: 97%   Weight: 79.4 kg (175 lb)         Physical Exam  Constitutional:       General: She is not in acute distress.     Appearance: Normal appearance. She is not ill-appearing or toxic-appearing.   HENT:      Head: Normocephalic and atraumatic.      Right Ear: Tympanic membrane, ear canal and external ear normal.      Left Ear: Tympanic membrane, ear canal and external ear normal.      Nose: Nose normal.      Mouth/Throat:      Mouth: Mucous membranes are moist.      Pharynx:

## 2024-02-03 ENCOUNTER — TELEPHONE (OUTPATIENT)
Age: 44
End: 2024-02-03

## 2024-02-03 NOTE — TELEPHONE ENCOUNTER
Pt called in.   Verified name & .   Pt informed of chest x-ray per LENA Hawkins.   Patient given an opportunity to ask questions, repeated information, and verbalized understanding.

## 2024-02-03 NOTE — TELEPHONE ENCOUNTER
----- Message from KHUSHI Reynoso NP sent at 2/2/2024 11:06 AM EST -----  Please inform pt that CXR shows no pneumonia but she does have some emphysema.  Meds as prescribed and recommend she follow up with her PCP.  Thanks.

## 2024-03-30 ENCOUNTER — OFFICE VISIT (OUTPATIENT)
Age: 44
End: 2024-03-30

## 2024-03-30 VITALS
DIASTOLIC BLOOD PRESSURE: 82 MMHG | TEMPERATURE: 97.9 F | HEIGHT: 63 IN | HEART RATE: 94 BPM | SYSTOLIC BLOOD PRESSURE: 155 MMHG | OXYGEN SATURATION: 100 % | BODY MASS INDEX: 30.12 KG/M2 | WEIGHT: 170 LBS | RESPIRATION RATE: 16 BRPM

## 2024-03-30 DIAGNOSIS — J01.90 ACUTE BACTERIAL SINUSITIS: Primary | ICD-10-CM

## 2024-03-30 DIAGNOSIS — R05.1 ACUTE COUGH: ICD-10-CM

## 2024-03-30 DIAGNOSIS — J02.9 SORE THROAT: ICD-10-CM

## 2024-03-30 DIAGNOSIS — R11.2 NAUSEA AND VOMITING, UNSPECIFIED VOMITING TYPE: ICD-10-CM

## 2024-03-30 DIAGNOSIS — B96.89 ACUTE BACTERIAL SINUSITIS: Primary | ICD-10-CM

## 2024-03-30 LAB
GROUP A STREP ANTIGEN, POC: NEGATIVE
INFLUENZA A ANTIGEN, POC: NEGATIVE
INFLUENZA B ANTIGEN, POC: NEGATIVE
Lab: NORMAL
PERFORMING INSTRUMENT: NORMAL
QC PASS/FAIL: NORMAL
SARS-COV-2, POC: NORMAL
VALID INTERNAL CONTROL, POC: NORMAL

## 2024-03-30 RX ORDER — ONDANSETRON 4 MG/1
4 TABLET, FILM COATED ORAL 3 TIMES DAILY PRN
Qty: 10 TABLET | Refills: 0 | Status: SHIPPED | OUTPATIENT
Start: 2024-03-30

## 2024-03-30 RX ORDER — ALBUTEROL SULFATE 90 UG/1
2 AEROSOL, METERED RESPIRATORY (INHALATION) EVERY 4 HOURS PRN
Qty: 18 G | Refills: 0 | Status: SHIPPED | OUTPATIENT
Start: 2024-03-30

## 2024-03-30 RX ORDER — AMOXICILLIN AND CLAVULANATE POTASSIUM 875; 125 MG/1; MG/1
1 TABLET, FILM COATED ORAL 2 TIMES DAILY
Qty: 14 TABLET | Refills: 0 | Status: SHIPPED | OUTPATIENT
Start: 2024-03-30 | End: 2024-04-06

## 2024-03-30 RX ORDER — PREDNISONE 20 MG/1
40 TABLET ORAL DAILY
Qty: 10 TABLET | Refills: 0 | Status: SHIPPED | OUTPATIENT
Start: 2024-03-30 | End: 2024-04-04

## 2024-03-30 ASSESSMENT — ENCOUNTER SYMPTOMS: VOMITING: 1

## 2024-03-30 NOTE — PATIENT INSTRUCTIONS
Chest xray looks negative to me, but I will call with results once back if radiologist sees anything  Negative covid, strep and flu  For now, lets treat as a bacterial sinusitis  Augmentin twice daily for 7 days  Prednisone 40mg daily for 5 days for wheezing and chest tightness  Continue your albuterol inhalers and nebulizer as needed  Zofran every 8 hours as needed for nausea/vomiting  Hot tea with honey, saline sinus rinses, throat lozenges  Lots of fluids, plenty of rest  Follow up with PCP if symptoms persist or worsen  Go to ED if you develop any shortness of breath, chest pain or if you are unable to tolerate food or fluids

## 2024-03-30 NOTE — PROGRESS NOTES
Ju Spicer (:  1980) is a 44 y.o. female,Established patient, here for evaluation of the following chief complaint(s):  Emesis (Pt. C/o nausea, fever, sore throat starting today )      ASSESSMENT/PLAN:  Visit Diagnoses and Associated Orders       Acute bacterial sinusitis    -  Primary    amoxicillin-clavulanate (AUGMENTIN) 875-125 MG per tablet [24256]           Sore throat        POCT COVID-19, Antigen [EDB976 Custom]      POCT Influenza A/B Antigen [06845 Custom]      AMB POC RAPID STREP A [78754 CPT(R)]           Acute cough        XR CHEST PA/LAT [85232 CPT(R)]   - Future Order    predniSONE (DELTASONE) 20 MG tablet [6496]      albuterol sulfate HFA (VENTOLIN HFA) 108 (90 Base) MCG/ACT inhaler [07436]           Nausea and vomiting, unspecified vomiting type        ondansetron (ZOFRAN) 4 MG tablet [11769]                 Chest xray is negative for pneumonia  Negative covid, strep and flu  For now, lets treat as a bacterial sinusitis  Augmentin twice daily for 7 days  Prednisone 40mg daily for 5 days for wheezing and chest tightness  Continue your albuterol inhalers and nebulizer as needed  Zofran every 8 hours as needed for nausea/vomiting  Hot tea with honey, saline sinus rinses, throat lozenges  Lots of fluids, plenty of rest  Follow up with PCP if symptoms persist or worsen  Go to ED if you develop any shortness of breath, chest pain or if you are unable to tolerate food or fluids      SUBJECTIVE/OBJECTIVE:    Emesis          44 y.o. female presents with symptoms of nausea, vomiting, chills, body aches, fatigue, nasal congestion, sore throat, cough and wheezing that began last night. States that she was ill last week with general upper respiratory symptoms but felt like she had fully resolved yesterday before worsening last night. Denies any current sick contacts, though there have been a number of sicknesses in her home over the past 1 month. She denies any measured fevers. Denies ear pain. Reports

## 2024-08-14 ENCOUNTER — OFFICE VISIT (OUTPATIENT)
Age: 44
End: 2024-08-14

## 2024-08-14 VITALS
HEART RATE: 101 BPM | OXYGEN SATURATION: 95 % | WEIGHT: 164 LBS | SYSTOLIC BLOOD PRESSURE: 130 MMHG | TEMPERATURE: 98.5 F | BODY MASS INDEX: 29.05 KG/M2 | DIASTOLIC BLOOD PRESSURE: 80 MMHG

## 2024-08-14 DIAGNOSIS — J45.20 MILD INTERMITTENT ASTHMA WITHOUT COMPLICATION: ICD-10-CM

## 2024-08-14 DIAGNOSIS — U07.1 COVID-19: Primary | ICD-10-CM

## 2024-08-14 RX ORDER — ALBUTEROL SULFATE 90 UG/1
2 AEROSOL, METERED RESPIRATORY (INHALATION) 4 TIMES DAILY PRN
Qty: 18 G | Refills: 0 | Status: SHIPPED | OUTPATIENT
Start: 2024-08-14

## 2024-08-14 RX ORDER — ALBUTEROL SULFATE 90 UG/1
2 AEROSOL, METERED RESPIRATORY (INHALATION) 4 TIMES DAILY PRN
Qty: 18 G | Refills: 0 | Status: SHIPPED | OUTPATIENT
Start: 2024-08-14 | End: 2024-08-14 | Stop reason: SDUPTHER

## 2024-08-15 NOTE — PATIENT INSTRUCTIONS
You are positive for COVID 19 today  Due to your risk factors you are a candidate for antiviral therapy  Paxlovid 2x daily for 5 days  Albuterol inhaler every 4-6 hours as needed for wheezing  I recommended good multisymptom relief medication, something like Tylenol severe cold and flu, Mucinex fast max, or DayQuil/NyQuil for symptom relief  Hot tea with honey, throat lozenges, saline sinus rinses  Lots of fluids, plenty of rest  Strict 5 day quarantine from onset of symptoms, continue to wear a well fitting mask for an additional 5 days beyond that  Go to the ED with any severe shortness of breath, chest pain, or if you are unable to keep down food and fluids

## 2024-08-15 NOTE — PROGRESS NOTES
Ju Spicer (:  1980) is a 44 y.o. female,Established patient, here for evaluation of the following chief complaint(s):  Covid Testing (Positive Covid test at home, 2024/Fatigue, body aches , headache, chest congestion , sore throat and nasal congestion )      Assessment & Plan :  Visit Diagnoses and Associated Orders       COVID-19    -  Primary    nirmatrelvir/ritonavir 300/100 (PAXLOVID, 300/100,) 20 x 150 MG & 10 x 100MG TBPK [086003]           Mild intermittent asthma without complication        albuterol sulfate HFA (VENTOLIN HFA) 108 (90 Base) MCG/ACT inhaler [69805]                 You are positive for COVID 19 today  Due to your risk factors you are a candidate for antiviral therapy  Paxlovid 2x daily for 5 days  Albuterol inhaler every 4-6 hours as needed for wheezing  I recommended good multisymptom relief medication, something like Tylenol severe cold and flu, Mucinex fast max, or DayQuil/NyQuil for symptom relief  Hot tea with honey, throat lozenges, saline sinus rinses  Lots of fluids, plenty of rest  Strict 5 day quarantine from onset of symptoms, continue to wear a well fitting mask for an additional 5 days beyond that  Go to the ED with any severe shortness of breath, chest pain, or if you are unable to keep down food and fluids       Subjective :  HPI     44 y.o. female presents with symptoms of 1 day of fevers, chills, body aches and fatigue.  Additionally she reports nasal congestion, sore throat and cough.  She tested herself for COVID prior to arrival today and tested positive.  She denies any shortness of breath or difficulty breathing.  She does report some wheezing which she attributes to her asthma.  No chest or abdominal pain.  She does report some nausea for which she is taking Zofran with good effect.  No vomiting or diarrhea.  She does have a history of asthma and worries about this being a risk factor for severe COVID.  She denies any known history of kidney or liver

## 2024-08-19 ENCOUNTER — OFFICE VISIT (OUTPATIENT)
Age: 44
End: 2024-08-19

## 2024-08-19 VITALS
SYSTOLIC BLOOD PRESSURE: 112 MMHG | BODY MASS INDEX: 28.52 KG/M2 | RESPIRATION RATE: 16 BRPM | TEMPERATURE: 98.1 F | WEIGHT: 161 LBS | DIASTOLIC BLOOD PRESSURE: 77 MMHG | HEART RATE: 86 BPM | OXYGEN SATURATION: 96 %

## 2024-08-19 DIAGNOSIS — J20.8 VIRAL BRONCHITIS: Primary | ICD-10-CM

## 2024-08-19 DIAGNOSIS — R05.1 ACUTE COUGH: ICD-10-CM

## 2024-08-19 RX ORDER — PREDNISONE 20 MG/1
40 TABLET ORAL DAILY
Qty: 10 TABLET | Refills: 0 | Status: SHIPPED | OUTPATIENT
Start: 2024-08-19 | End: 2024-08-24

## 2024-08-19 ASSESSMENT — ENCOUNTER SYMPTOMS: COUGH: 1

## 2024-08-19 NOTE — PATIENT INSTRUCTIONS
Symptoms consistent with viral bronchitis  Vital signs are stable, physical exam reveals bilateral wheezing  Chest x-ray shows no acute intrathoracic findings, will treat as bronchitis  Vital signs are stable, no shortness of breath or red flag symptoms to warrant further evaluation in ED at this time  Will treat with prednisone, 40 mg daily for 5 days  Continue your home albuterol inhaler every 4-6 hours as needed  Continue taking over-the-counter cold and flu medications as needed for symptoms  Lots of fluids, plenty of rest  Hot tea with honey, throat lozenges  Follow up with PCP if symptoms persist or worsen  Go to ED if you develop any shortness of breath, chest pain or if you are unable to tolerate food or fluids

## 2024-08-19 NOTE — PROGRESS NOTES
Ju Spicer (:  1980) is a 44 y.o. female,Established patient, here for evaluation of the following chief complaint(s):  Cough (Cough, chest congestion, SOB, POSITIVE at-home covid test 5 days ago.  )      Assessment & Plan :  Visit Diagnoses and Associated Orders       Viral bronchitis    -  Primary    predniSONE (DELTASONE) 20 MG tablet [6496]           Acute cough        XR CHEST PA/LAT [30152 CPT(R)]   - Future Order               Symptoms consistent with viral bronchitis  Vital signs are stable, physical exam reveals bilateral wheezing  Chest x-ray shows no acute intrathoracic findings, will treat as bronchitis  Vital signs are stable, no shortness of breath or red flag symptoms to warrant further evaluation in ED at this time  Will treat with prednisone, 40 mg daily for 5 days  Continue your home albuterol inhaler every 4-6 hours as needed  Continue taking over-the-counter cold and flu medications as needed for symptoms  Lots of fluids, plenty of rest  Hot tea with honey, throat lozenges  Follow up with PCP if symptoms persist or worsen  Go to ED if you develop any shortness of breath, chest pain or if you are unable to tolerate food or fluids       Subjective :    Cough         44 y.o. female presents with symptoms of 5 days of cough, wheezing and shortness of breath.  She states she was diagnosed with COVID about 5 days ago.  She is taking Paxlovid and in general feels like many of her symptoms have improved.  She denies fevers, chills, body aches or fatigue.  She does still report some nasal congestion with postnasal drip.  Feels like her cough is becoming increasingly productive and congested sounding.  She does report some thick yellow to brown sputum production with her cough.  She does have a history of asthma for which she uses an inhaler.  No chest or abdominal pain, no nausea, vomiting or diarrhea.  She has 1 more day of Paxlovid to take.         Vitals:    24 1147   BP: 112/77   Site:

## 2024-11-11 ENCOUNTER — OFFICE VISIT (OUTPATIENT)
Age: 44
End: 2024-11-11

## 2024-11-11 VITALS
WEIGHT: 161 LBS | OXYGEN SATURATION: 96 % | DIASTOLIC BLOOD PRESSURE: 78 MMHG | HEART RATE: 78 BPM | BODY MASS INDEX: 28.52 KG/M2 | RESPIRATION RATE: 16 BRPM | TEMPERATURE: 98.4 F | SYSTOLIC BLOOD PRESSURE: 124 MMHG

## 2024-11-11 DIAGNOSIS — M62.830 SPASM OF MUSCLE OF LOWER BACK: Primary | ICD-10-CM

## 2024-11-11 DIAGNOSIS — R10.9 FLANK PAIN: ICD-10-CM

## 2024-11-11 LAB
BILIRUBIN, URINE, POC: NEGATIVE
BLOOD URINE, POC: NEGATIVE
GLUCOSE URINE, POC: NEGATIVE
KETONES, URINE, POC: NEGATIVE
LEUKOCYTE ESTERASE, URINE, POC: NEGATIVE
NITRITE, URINE, POC: NEGATIVE
PH, URINE, POC: 6 (ref 4.6–8)
PROTEIN,URINE, POC: NEGATIVE
SPECIFIC GRAVITY, URINE, POC: 1.01 (ref 1–1.03)
URINALYSIS CLARITY, POC: CLEAR
URINALYSIS COLOR, POC: YELLOW
UROBILINOGEN, POC: NORMAL

## 2024-11-11 RX ORDER — METHOCARBAMOL 750 MG/1
750 TABLET, FILM COATED ORAL 2 TIMES DAILY PRN
Qty: 10 TABLET | Refills: 0 | Status: SHIPPED | OUTPATIENT
Start: 2024-11-11 | End: 2024-11-16

## 2024-11-11 NOTE — PATIENT INSTRUCTIONS
Patient was seen today for low back pain without sciatica  Urinalysis is negative for infection, will send urine culture to confirm  No evidence of cauda equina or other neurovascular compromise  Methocarbamol up to 2 times daily as needed for pain and spasm.  Be advised that this will make you sleepy and it is not advised to drive your car while taking this  Tylenol/ibuprofen over-the-counter as needed for pain  Heating pads for 15 to 20 minutes at a time several times per day  Gentle stretching as tolerated over the next several days  Please monitor symptoms carefully, go immediately to the emergency department for any loss of bowel or bladder control, or if you develop any weakness, numbness or tingling of the feet or legs

## 2024-11-11 NOTE — PROGRESS NOTES
mild relief         Vitals:    11/11/24 1319   BP: 124/78   Site: Right Upper Arm   Position: Sitting   Cuff Size: Medium Adult   Pulse: 78   Resp: 16   Temp: 98.4 °F (36.9 °C)   SpO2: 96%   Weight: 73 kg (161 lb)       Results for orders placed or performed in visit on 11/11/24   AMB POC URINALYSIS DIP STICK AUTO W/O MICRO   Result Value Ref Range    Color, Urine, POC Yellow     Clarity, Urine, POC Clear     Glucose, Urine, POC Negative     Bilirubin, Urine, POC Negative     Ketones, Urine, POC Negative     Specific Gravity, Urine, POC 1.010 1.001 - 1.035    Blood, Urine, POC Negative Negative    pH, Urine, POC 6.0 4.6 - 8.0    Protein, Urine, POC Negative     Urobilinogen, POC 0.2 mg/dL     Nitrite, Urine, POC Negative     Leukocyte Esterase, Urine, POC Negative           Objective   Physical Exam  Vitals and nursing note reviewed.   Constitutional:       General: She is not in acute distress.     Appearance: Normal appearance. She is not ill-appearing.   HENT:      Head: Normocephalic and atraumatic.   Cardiovascular:      Rate and Rhythm: Normal rate and regular rhythm.      Pulses: Normal pulses.      Heart sounds: Normal heart sounds. No murmur heard.     No friction rub. No gallop.   Pulmonary:      Effort: Pulmonary effort is normal. No respiratory distress.      Breath sounds: Normal breath sounds. No wheezing, rhonchi or rales.   Abdominal:      General: Abdomen is flat. There is no distension.      Palpations: Abdomen is soft. There is no mass.      Tenderness: There is no abdominal tenderness. There is no right CVA tenderness, left CVA tenderness or guarding.   Musculoskeletal:      Cervical back: Normal.      Thoracic back: Normal.      Lumbar back: Spasms and tenderness present. No swelling or bony tenderness. Normal range of motion. Negative right straight leg raise test and negative left straight leg raise test.        Back:    Skin:     General: Skin is warm and dry.   Neurological:      General: No

## 2024-11-12 LAB
BACTERIA SPEC CULT: NORMAL
CC UR VC: NORMAL
SERVICE CMNT-IMP: NORMAL